# Patient Record
Sex: FEMALE | Race: WHITE | Employment: PART TIME | URBAN - METROPOLITAN AREA
[De-identification: names, ages, dates, MRNs, and addresses within clinical notes are randomized per-mention and may not be internally consistent; named-entity substitution may affect disease eponyms.]

---

## 2022-02-19 ENCOUNTER — HOSPITAL ENCOUNTER (EMERGENCY)
Age: 22
Discharge: HOME | End: 2022-02-19
Attending: EMERGENCY MEDICINE
Payer: COMMERCIAL

## 2022-02-19 VITALS
RESPIRATION RATE: 16 BRPM | DIASTOLIC BLOOD PRESSURE: 78 MMHG | BODY MASS INDEX: 22.15 KG/M2 | HEIGHT: 63 IN | TEMPERATURE: 98.4 F | WEIGHT: 125 LBS | HEART RATE: 81 BPM | OXYGEN SATURATION: 100 % | SYSTOLIC BLOOD PRESSURE: 117 MMHG

## 2022-02-19 DIAGNOSIS — S61.211A LACERATION OF LEFT INDEX FINGER WITHOUT FOREIGN BODY WITHOUT DAMAGE TO NAIL, INITIAL ENCOUNTER: Primary | ICD-10-CM

## 2022-02-19 PROCEDURE — 99282 EMERGENCY DEPT VISIT SF MDM: CPT

## 2022-02-19 PROCEDURE — 90715 TDAP VACCINE 7 YRS/> IM: CPT | Performed by: PHYSICIAN ASSISTANT

## 2022-02-19 PROCEDURE — 90471 IMMUNIZATION ADMIN: CPT | Performed by: PHYSICIAN ASSISTANT

## 2022-02-19 PROCEDURE — 6360000002 HC RX W HCPCS: Performed by: PHYSICIAN ASSISTANT

## 2022-02-19 PROCEDURE — 12001 RPR S/N/AX/GEN/TRNK 2.5CM/<: CPT

## 2022-02-19 RX ADMIN — TETANUS TOXOID, REDUCED DIPHTHERIA TOXOID AND ACELLULAR PERTUSSIS VACCINE, ADSORBED 0.5 ML: 5; 2.5; 8; 8; 2.5 SUSPENSION INTRAMUSCULAR at 14:29

## 2022-02-19 ASSESSMENT — PAIN DESCRIPTION - PAIN TYPE: TYPE: ACUTE PAIN

## 2022-02-19 ASSESSMENT — PAIN SCALES - GENERAL: PAINLEVEL_OUTOF10: 2

## 2022-02-19 ASSESSMENT — PAIN - FUNCTIONAL ASSESSMENT: PAIN_FUNCTIONAL_ASSESSMENT: 0-10

## 2022-02-19 ASSESSMENT — PAIN DESCRIPTION - ORIENTATION: ORIENTATION: LEFT

## 2022-02-19 ASSESSMENT — PAIN DESCRIPTION - LOCATION: LOCATION: HAND

## 2022-02-19 NOTE — ED PROVIDER NOTES
[address] [facility] ED  eMERGENCY dEPARTMENT eNCOUnter   Independent Attestation     Pt Name: Erik Li  MRN: [id number]  [place] 2000  Date of evaluation: 2/19/22       Erik Li is a 24 y.o. female who presents with Laceration (Left first finger laceration around 1115 today while cutting fruit at work, states second finer also cut but only minimally, unknown last tetanus)      Vitals:   Vitals:    02/19/22 1324   BP: 117/78   Pulse: 81   Resp: 16   Temp: 98.4 °F (36.9 °C)   TempSrc: Oral   SpO2: 100%   Weight: 56.7 kg (125 lb)   Height: 5' 3\" (1.6 m)       Impression:   1. Laceration of left index finger without foreign body without damage to nail, initial encounter          I was personally available for consultation in the Emergency Department. I have reviewed the chart and agree with the documentation as recorded by the [facility], including the assessment, treatment plan and disposition.     Rafy Marc MD  Attending Emergency  Physician        Rosa Baum MD  02/19/22 0172

## 2022-02-19 NOTE — ED PROVIDER NOTES
[address] [facility] ED  [address] [facility] [address] [facility] [address]  Phone: [phone]  Fax: [address]      Pt Name: Quincy Chou  MRN: [id number]  [place] 2000  Date of evaluation: 2/19/22      CHIEF COMPLAINT:  Chief Complaint   Patient presents with    Laceration     Left first finger laceration around 1115 today while cutting fruit at work, states second finer also cut but only minimally, unknown last tetanus       HISTORY OF PRESENT ILLNESS    Quincy Chou is a 24 y.o. female who presents for evaluation of a laceration:     Location/Symptom:  Left index finger laceration  Timing/Onset: 2 hrs PTA  Context/Setting:   Pt cut finger on knife while cutting fruit at work. No focal weakness/paresthesias. Bleeding controlled with pressure. Tetanus UTD? YES    Any broken fragments/shards of material(s) found near site of incident? NO    Quality: sharp, achy  Duration: constant  Modifying Factors: worse with movement  Severity: moderate    Nursing Notes were reviewed. REVIEW OF SYSTEMS       Constitutional: Denies recent fever, chills. HEENT: No visual changes. Neck: No neck pain. Respiratory: Denies recent shortness of breath. Cardiac:  Denies recent chest pain. GI:  Denies abdominal pain/nausea/vomiting/diarrhea. Musculoskeletal: Denies focal weakness. Neurologic: Denies headache or focal weakness. Skin:  laceration    Negative in 10 essential Systems except as mentioned above and in the HPI. PAST MEDICAL HISTORY   PMH:  has no past medical history on file. Surgical History:  has no past surgical history on file. Social History:    Family History: None  Psychiatric History: None    Allergies:has No Known Allergies.       PHYSICAL EXAM     INITIAL VITALS: /78   Pulse 81   Temp 98.4 °F (36.9 °C) (Oral)   Resp 16   Ht 5' 3\" (1.6 m)   Wt 56.7 kg (125 lb)   SpO2 100%   BMI 22.14 kg/m²   Constitutional:  Well developed Eyes:  Pupils equal/round  HENT:  Atraumatic scalp/face, external ears normal, nose normal  Respiratory:  Comfortable speech and breathing  Musculoskeletal:   0.75cm laceration of left index finger pad radial aspect, brisk venous bleeding, full ROM of DIP/PIP joint, no deficits. No nail involvement, NV Intact distally  Integument:  As above  Neurologic:  Alert, appropriate interaction/mentation, no focal deficits noted       DIAGNOSTIC RESULTS     EKG: All EKG's are interpreted by the Emergency Department Physician who either signs or Co-signs this chart in the absence of a cardiologist.  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 This is reasonable as for such a small laceration this is still bleeding briskly even after compression. I have reviewed the disposition diagnosis with the patient and or their family/guardian. I have discussed suture and wound care. I have answered their questions and given discharge instructions. They voiced understanding of these instructions and did not have any further questions or complaints. Laceration Repair Procedure Note    Indication: Laceration    Location:  Left index finger    Procedure: The patient was placed in the appropriate position and anesthesia around the laceration was obtained by infiltration using 1% Lidocaine without epinephrine. The area around wound(s) was then cleansed with betadine, draped in a sterile fashion and irrigated copiously with normal saline/betadine dilution. The wound(s) were explored well, no foreign bodies or tendon rupture/injury was found. The laceration was closed with 5-0 Prolene using interrupted sutures. There were no additional lacerations requiring repair.  The wound area was then dressed with bacitracin. Total repaired wound length: 0.75cm  Count: Suture count: 1    The patient tolerated the procedure well. Complications: None    Orders Placed This Encounter   Medications    Tetanus-Diphth-Acell Pertussis (BOOSTRIX) injection 0.5 mL       CONSULTS:  None      FINAL IMPRESSION      1. Laceration of left index finger without foreign body without damage to nail, initial encounter          DISPOSITION/PLAN:  DISPOSITION          PATIENT REFERRED TO:  [facility]  [address] [address]  [phone]  Go in 1 week  For suture/staple removal go to Family [facility] or ELUIS CARLOS      [address] [place], [place] [address]suzanne Espinozavard:  New Prescriptions    No medications on file       (Please note that portions of this note were completed with a voice recognition program.  Efforts were made to edit the dictations but occasionally words are mis-transcribed.)    JASON Rodriguez PA-C  02/19/22 1409

## 2022-02-19 NOTE — ED NOTES
Patient provided with discharge instructions, prescriptions, and follow up information. Verbalized understanding. No IV access to discontinue. A&OX3. Steady gait noted at discharge. Wheelchair declined by patient.       Jj Granger RN  02/19/22 3476